# Patient Record
Sex: FEMALE | Race: WHITE
[De-identification: names, ages, dates, MRNs, and addresses within clinical notes are randomized per-mention and may not be internally consistent; named-entity substitution may affect disease eponyms.]

---

## 2019-12-21 ENCOUNTER — HOSPITAL ENCOUNTER (OUTPATIENT)
Dept: HOSPITAL 95 - ER | Age: 64
Setting detail: OBSERVATION
LOS: 1 days | Discharge: HOME | End: 2019-12-22
Attending: HOSPITALIST | Admitting: HOSPITALIST
Payer: COMMERCIAL

## 2019-12-21 VITALS — WEIGHT: 198.2 LBS | BODY MASS INDEX: 31.85 KG/M2 | HEIGHT: 65.98 IN

## 2019-12-21 DIAGNOSIS — I44.1: ICD-10-CM

## 2019-12-21 DIAGNOSIS — I48.91: ICD-10-CM

## 2019-12-21 DIAGNOSIS — F43.9: ICD-10-CM

## 2019-12-21 DIAGNOSIS — Z79.01: ICD-10-CM

## 2019-12-21 DIAGNOSIS — Z95.0: ICD-10-CM

## 2019-12-21 DIAGNOSIS — Z79.899: ICD-10-CM

## 2019-12-21 DIAGNOSIS — I10: ICD-10-CM

## 2019-12-21 DIAGNOSIS — E03.9: ICD-10-CM

## 2019-12-21 DIAGNOSIS — I16.0: Primary | ICD-10-CM

## 2019-12-21 DIAGNOSIS — E78.00: ICD-10-CM

## 2019-12-21 LAB
ALBUMIN SERPL BCP-MCNC: 3.7 G/DL (ref 3.4–5)
ALBUMIN/GLOB SERPL: 0.8 {RATIO} (ref 0.8–1.8)
ALT SERPL W P-5'-P-CCNC: 36 U/L (ref 12–78)
ANION GAP SERPL CALCULATED.4IONS-SCNC: 7 MMOL/L (ref 6–16)
AST SERPL W P-5'-P-CCNC: 43 U/L (ref 12–37)
BASOPHILS # BLD AUTO: 0.09 K/MM3 (ref 0–0.23)
BASOPHILS NFR BLD AUTO: 1 % (ref 0–2)
BILIRUB SERPL-MCNC: 0.4 MG/DL (ref 0.1–1)
BUN SERPL-MCNC: 12 MG/DL (ref 8–24)
CALCIUM SERPL-MCNC: 8.9 MG/DL (ref 8.5–10.1)
CHLORIDE SERPL-SCNC: 108 MMOL/L (ref 98–108)
CO2 SERPL-SCNC: 24 MMOL/L (ref 21–32)
CREAT SERPL-MCNC: 0.67 MG/DL (ref 0.4–1)
DEPRECATED RDW RBC AUTO: 42.6 FL (ref 35.1–46.3)
EOSINOPHIL # BLD AUTO: 0.09 K/MM3 (ref 0–0.68)
EOSINOPHIL NFR BLD AUTO: 1 % (ref 0–6)
ERYTHROCYTE [DISTWIDTH] IN BLOOD BY AUTOMATED COUNT: 12.4 % (ref 11.7–14.2)
GLOBULIN SER CALC-MCNC: 4.7 G/DL (ref 2.2–4)
GLUCOSE SERPL-MCNC: 122 MG/DL (ref 70–99)
HCT VFR BLD AUTO: 46.6 % (ref 33–51)
HGB BLD-MCNC: 15.1 G/DL (ref 11.5–16)
IMM GRANULOCYTES # BLD AUTO: 0.02 K/MM3 (ref 0–0.1)
IMM GRANULOCYTES NFR BLD AUTO: 0 % (ref 0–1)
LYMPHOCYTES # BLD AUTO: 2.9 K/MM3 (ref 0.84–5.2)
LYMPHOCYTES NFR BLD AUTO: 29 % (ref 21–46)
MCHC RBC AUTO-ENTMCNC: 32.4 G/DL (ref 31.5–36.5)
MCV RBC AUTO: 94 FL (ref 80–100)
MONOCYTES # BLD AUTO: 0.59 K/MM3 (ref 0.16–1.47)
MONOCYTES NFR BLD AUTO: 6 % (ref 4–13)
NEUTROPHILS # BLD AUTO: 6.22 K/MM3 (ref 1.96–9.15)
NEUTROPHILS NFR BLD AUTO: 63 % (ref 41–73)
NRBC # BLD AUTO: 0 K/MM3 (ref 0–0.02)
NRBC BLD AUTO-RTO: 0 /100 WBC (ref 0–0.2)
PLATELET # BLD AUTO: 290 K/MM3 (ref 150–400)
POTASSIUM SERPL-SCNC: 4 MMOL/L (ref 3.5–5.5)
PROT SERPL-MCNC: 8.4 G/DL (ref 6.4–8.2)
SODIUM SERPL-SCNC: 139 MMOL/L (ref 136–145)
TROPONIN I SERPL-MCNC: <0.015 NG/ML (ref 0–0.04)
TSH SERPL DL<=0.005 MIU/L-ACNC: 6.53 UIU/ML (ref 0.36–4.8)

## 2019-12-21 PROCEDURE — G0378 HOSPITAL OBSERVATION PER HR: HCPCS

## 2019-12-22 LAB
ALBUMIN SERPL BCP-MCNC: 3 G/DL (ref 3.4–5)
ALBUMIN/GLOB SERPL: 0.7 {RATIO} (ref 0.8–1.8)
ALT SERPL W P-5'-P-CCNC: 25 U/L (ref 12–78)
ANION GAP SERPL CALCULATED.4IONS-SCNC: 7 MMOL/L (ref 6–16)
AST SERPL W P-5'-P-CCNC: 29 U/L (ref 12–37)
BILIRUB SERPL-MCNC: 0.4 MG/DL (ref 0.1–1)
BUN SERPL-MCNC: 11 MG/DL (ref 8–24)
CALCIUM SERPL-MCNC: 8.2 MG/DL (ref 8.5–10.1)
CHLORIDE SERPL-SCNC: 109 MMOL/L (ref 98–108)
CK MB CFR SERPL CALC: 1.4 % (ref 0–4)
CK MB CFR SERPL CALC: 1.4 % (ref 0–4)
CK SERPL-CCNC: 382 U/L (ref 26–193)
CK SERPL-CCNC: 407 U/L (ref 26–193)
CO2 SERPL-SCNC: 26 MMOL/L (ref 21–32)
CREAT SERPL-MCNC: 0.77 MG/DL (ref 0.4–1)
DEPRECATED RDW RBC AUTO: 42.5 FL (ref 35.1–46.3)
ERYTHROCYTE [DISTWIDTH] IN BLOOD BY AUTOMATED COUNT: 12.4 % (ref 11.7–14.2)
GLOBULIN SER CALC-MCNC: 4.1 G/DL (ref 2.2–4)
GLUCOSE SERPL-MCNC: 96 MG/DL (ref 70–99)
HCT VFR BLD AUTO: 43.7 % (ref 33–51)
HGB BLD-MCNC: 14.1 G/DL (ref 11.5–16)
MCHC RBC AUTO-ENTMCNC: 32.3 G/DL (ref 31.5–36.5)
MCV RBC AUTO: 93 FL (ref 80–100)
NRBC # BLD AUTO: 0 K/MM3 (ref 0–0.02)
NRBC BLD AUTO-RTO: 0 /100 WBC (ref 0–0.2)
PLATELET # BLD AUTO: 272 K/MM3 (ref 150–400)
POTASSIUM SERPL-SCNC: 3.6 MMOL/L (ref 3.5–5.5)
PROT SERPL-MCNC: 7.1 G/DL (ref 6.4–8.2)
SODIUM SERPL-SCNC: 142 MMOL/L (ref 136–145)
TROPONIN I SERPL-MCNC: <0.015 NG/ML (ref 0–0.04)
TROPONIN I SERPL-MCNC: <0.015 NG/ML (ref 0–0.04)

## 2019-12-22 NOTE — NUR
NEW ADMIT TO FLOOR
PT CAME TO FLOOR THIS AM AROUND 0135, ARRIVED BY W/MIRTA CAMACHO. STATES ACHY CHEST
PAIN IS 1/10 AT THIS TIME & POINTS TO LUCW OVER L BREAST. VSS. REPORTS SHE HAS
BEEN "FEELING STRESSED & OVERWHELMED" BECAUSE OF ENMA & FAMILY COMING [place] [facility] [employer]. STATES SHE STARTED FEELING CHEST PAIN THE SAME TIME SHE
STARTED FEELING OVERWHELMED ABOUT "ALL THE COOKING & DECORATIONS & PRESENTS."
TELE IN PLACE RUNNING 100% PACED W/HR 60. DENIES N/V OR DYSPNEA. CALL LIGHT IN
REACH & I WILL CONTINUE TO MONITOR.

## 2019-12-22 NOTE — NUR
SHIFT SUMMARY
NO ACUTE CHANGES SINCE ADMIT TO FLOOR. AOX4. PT DENIES SOB, N/V OR ANY CHEST
PAIN/PRESSURE. VSS. TELE IN PLACE, RUNNING 100% PACED W/HR 60. CALL LIGHT IN
REACH & I WILL CONTINUE TO MONITOR.

## 2019-12-22 NOTE — NUR
Discharge Summary
A/Ox4, pleasant and cooperative, independent for all ADL's. Discharge to home
via personal vehicle. Pt will be escorted via w/c by CNA. IV removed,
discharge instructions and educational materials reviewed with patient, no
questions or concerns at this time. No new meds ordered. Personal belongings
will be sent home with patient.

## 2020-10-26 ENCOUNTER — HOSPITAL ENCOUNTER (OUTPATIENT)
Dept: HOSPITAL 95 - ORSCSDS | Age: 65
Discharge: HOME | End: 2020-10-26
Attending: INTERNAL MEDICINE
Payer: MEDICARE

## 2020-10-26 VITALS — BODY MASS INDEX: 34.16 KG/M2 | WEIGHT: 205.03 LBS | HEIGHT: 65 IN

## 2020-10-26 DIAGNOSIS — R73.03: ICD-10-CM

## 2020-10-26 DIAGNOSIS — I89.0: ICD-10-CM

## 2020-10-26 DIAGNOSIS — I48.91: ICD-10-CM

## 2020-10-26 DIAGNOSIS — G47.33: ICD-10-CM

## 2020-10-26 DIAGNOSIS — D12.3: ICD-10-CM

## 2020-10-26 DIAGNOSIS — E78.00: ICD-10-CM

## 2020-10-26 DIAGNOSIS — E05.90: ICD-10-CM

## 2020-10-26 DIAGNOSIS — D12.4: ICD-10-CM

## 2020-10-26 DIAGNOSIS — Z79.899: ICD-10-CM

## 2020-10-26 DIAGNOSIS — K64.8: ICD-10-CM

## 2020-10-26 DIAGNOSIS — I10: ICD-10-CM

## 2020-10-26 DIAGNOSIS — Z12.11: Primary | ICD-10-CM

## 2020-10-26 DIAGNOSIS — K57.30: ICD-10-CM

## 2020-10-26 DIAGNOSIS — E66.01: ICD-10-CM

## 2020-10-26 PROCEDURE — 0DBL8ZX EXCISION OF TRANSVERSE COLON, VIA NATURAL OR ARTIFICIAL OPENING ENDOSCOPIC, DIAGNOSTIC: ICD-10-PCS | Performed by: INTERNAL MEDICINE

## 2020-10-26 PROCEDURE — 0DBM8ZX EXCISION OF DESCENDING COLON, VIA NATURAL OR ARTIFICIAL OPENING ENDOSCOPIC, DIAGNOSTIC: ICD-10-PCS | Performed by: INTERNAL MEDICINE

## 2023-08-18 ENCOUNTER — HOSPITAL ENCOUNTER (EMERGENCY)
Dept: HOSPITAL 95 - ER | Age: 68
Discharge: HOME | End: 2023-08-18
Payer: MEDICARE

## 2023-08-18 VITALS — WEIGHT: 209.99 LBS | BODY MASS INDEX: 34.99 KG/M2 | HEIGHT: 65 IN

## 2023-08-18 VITALS — SYSTOLIC BLOOD PRESSURE: 128 MMHG | DIASTOLIC BLOOD PRESSURE: 83 MMHG

## 2023-08-18 DIAGNOSIS — Z95.0: ICD-10-CM

## 2023-08-18 DIAGNOSIS — I10: ICD-10-CM

## 2023-08-18 DIAGNOSIS — Z87.891: ICD-10-CM

## 2023-08-18 DIAGNOSIS — E07.9: ICD-10-CM

## 2023-08-18 DIAGNOSIS — Z79.899: ICD-10-CM

## 2023-08-18 DIAGNOSIS — R07.89: Primary | ICD-10-CM

## 2023-08-18 DIAGNOSIS — Z79.01: ICD-10-CM

## 2023-08-18 DIAGNOSIS — I48.91: ICD-10-CM

## 2023-08-18 DIAGNOSIS — Z88.8: ICD-10-CM

## 2023-08-18 DIAGNOSIS — E78.00: ICD-10-CM

## 2023-08-18 LAB
ALBUMIN SERPL BCP-MCNC: 3.4 G/DL (ref 3.4–5)
ALBUMIN/GLOB SERPL: 0.8 {RATIO} (ref 0.8–1.8)
ALT SERPL W P-5'-P-CCNC: 24 U/L (ref 12–78)
ANION GAP SERPL CALCULATED.4IONS-SCNC: 3 MMOL/L (ref 6–16)
AST SERPL W P-5'-P-CCNC: 25 U/L (ref 12–37)
BASOPHILS # BLD AUTO: 0.07 K/MM3 (ref 0–0.23)
BASOPHILS NFR BLD AUTO: 1 % (ref 0–2)
BILIRUB SERPL-MCNC: 0.5 MG/DL (ref 0.1–1)
BUN SERPL-MCNC: 15 MG/DL (ref 8–24)
CALCIUM SERPL-MCNC: 9.5 MG/DL (ref 8.5–10.1)
CHLORIDE SERPL-SCNC: 107 MMOL/L (ref 98–108)
CO2 SERPL-SCNC: 30 MMOL/L (ref 21–32)
CREAT SERPL-MCNC: 0.81 MG/DL (ref 0.4–1)
DEPRECATED RDW RBC AUTO: 47.5 FL (ref 35.1–46.3)
EOSINOPHIL # BLD AUTO: 0.11 K/MM3 (ref 0–0.68)
EOSINOPHIL NFR BLD AUTO: 1 % (ref 0–6)
ERYTHROCYTE [DISTWIDTH] IN BLOOD BY AUTOMATED COUNT: 14.7 % (ref 11.7–14.2)
GLOBULIN SER CALC-MCNC: 4.4 G/DL (ref 2.2–4)
GLUCOSE SERPL-MCNC: 109 MG/DL (ref 70–99)
HCT VFR BLD AUTO: 44 % (ref 33–51)
HGB BLD-MCNC: 14.4 G/DL (ref 11.5–16)
IMM GRANULOCYTES # BLD AUTO: 0.03 K/MM3 (ref 0–0.1)
IMM GRANULOCYTES NFR BLD AUTO: 0 % (ref 0–1)
LYMPHOCYTES # BLD AUTO: 2.52 K/MM3 (ref 0.84–5.2)
LYMPHOCYTES NFR BLD AUTO: 32 % (ref 21–46)
MCHC RBC AUTO-ENTMCNC: 32.7 G/DL (ref 31.5–36.5)
MCV RBC AUTO: 87 FL (ref 80–100)
MONOCYTES # BLD AUTO: 0.67 K/MM3 (ref 0.16–1.47)
MONOCYTES NFR BLD AUTO: 9 % (ref 4–13)
NEUTROPHILS # BLD AUTO: 4.45 K/MM3 (ref 1.96–9.15)
NEUTROPHILS NFR BLD AUTO: 57 % (ref 41–73)
NRBC # BLD AUTO: 0 K/MM3 (ref 0–0.02)
NRBC BLD AUTO-RTO: 0 /100 WBC (ref 0–0.2)
PLATELET # BLD AUTO: 301 K/MM3 (ref 150–400)
POTASSIUM SERPL-SCNC: 4.5 MMOL/L (ref 3.5–5.5)
PROT SERPL-MCNC: 7.8 G/DL (ref 6.4–8.2)
SODIUM SERPL-SCNC: 140 MMOL/L (ref 136–145)
SP GR SPEC: 1.01 (ref 1–1.02)
UROBILINOGEN UR STRIP-MCNC: (no result) MG/DL